# Patient Record
(demographics unavailable — no encounter records)

---

## 2025-06-25 NOTE — HISTORY OF PRESENT ILLNESS
[FreeTextEntry1] : pt comes for annual exam . she has no complaints. On birth control from [facility] . She has it sent to her . Normal menses with ocp .